# Patient Record
Sex: MALE
[De-identification: names, ages, dates, MRNs, and addresses within clinical notes are randomized per-mention and may not be internally consistent; named-entity substitution may affect disease eponyms.]

---

## 2018-01-31 ENCOUNTER — CHARTING TRANS (OUTPATIENT)
Dept: OTHER | Age: 43
End: 2018-01-31

## 2018-02-01 ENCOUNTER — CHARTING TRANS (OUTPATIENT)
Dept: OTHER | Age: 43
End: 2018-02-01

## 2024-07-17 ENCOUNTER — APPOINTMENT (OUTPATIENT)
Dept: ULTRASOUND IMAGING | Age: 49
End: 2024-07-17
Attending: EMERGENCY MEDICINE
Payer: COMMERCIAL

## 2024-07-17 ENCOUNTER — APPOINTMENT (OUTPATIENT)
Dept: GENERAL RADIOLOGY | Age: 49
End: 2024-07-17
Attending: EMERGENCY MEDICINE
Payer: COMMERCIAL

## 2024-07-17 ENCOUNTER — HOSPITAL ENCOUNTER (EMERGENCY)
Age: 49
Discharge: HOME | End: 2024-07-17
Attending: EMERGENCY MEDICINE
Payer: COMMERCIAL

## 2024-07-17 VITALS
DIASTOLIC BLOOD PRESSURE: 103 MMHG | RESPIRATION RATE: 16 BRPM | SYSTOLIC BLOOD PRESSURE: 134 MMHG | TEMPERATURE: 98 F | HEART RATE: 68 BPM | HEIGHT: 71 IN | BODY MASS INDEX: 30.1 KG/M2 | OXYGEN SATURATION: 100 % | WEIGHT: 215 LBS

## 2024-07-17 DIAGNOSIS — M25.562 ACUTE PAIN OF LEFT KNEE: Primary | ICD-10-CM

## 2024-07-17 DIAGNOSIS — R03.0 ELEVATED BLOOD PRESSURE READING: ICD-10-CM

## 2024-07-17 PROCEDURE — 99284 EMERGENCY DEPT VISIT MOD MDM: CPT

## 2024-07-17 PROCEDURE — 99285 EMERGENCY DEPT VISIT HI MDM: CPT

## 2024-07-17 PROCEDURE — 93971 EXTREMITY STUDY: CPT | Performed by: EMERGENCY MEDICINE

## 2024-07-17 PROCEDURE — 96372 THER/PROPH/DIAG INJ SC/IM: CPT

## 2024-07-17 PROCEDURE — 73562 X-RAY EXAM OF KNEE 3: CPT | Performed by: EMERGENCY MEDICINE

## 2024-07-17 RX ORDER — NAPROXEN SODIUM 220 MG
1-2 TABLET ORAL
COMMUNITY

## 2024-07-17 RX ORDER — KETOROLAC TROMETHAMINE 10 MG/1
10 TABLET, FILM COATED ORAL EVERY 6 HOURS PRN
Qty: 10 TABLET | Refills: 0 | Status: SHIPPED | OUTPATIENT
Start: 2024-07-17 | End: 2024-07-24

## 2024-07-17 RX ORDER — KETOROLAC TROMETHAMINE 30 MG/ML
60 INJECTION, SOLUTION INTRAMUSCULAR; INTRAVENOUS ONCE
Status: COMPLETED | OUTPATIENT
Start: 2024-07-17 | End: 2024-07-17

## 2024-07-17 NOTE — ED PROVIDER NOTES
Patient Seen in: [facility] In [place]      History     Chief Complaint   Patient presents with    Leg or Foot Injury     Stated Complaint: Left knee pain - denie injury    Subjective:   HPI    Patient is a 48-year-old male presenting to the ED with left knee pain for the last 4 to 5 days.  The patient denies any associated injury or trauma.  He works for the [employer].  He states despite his knee pain, he has not stopped working around his house as well as at work.  No known history of gout.  He does have some associated swelling.  No overlying rash or redness.  No fevers or chills.  No previous left knee surgery.  No fever or chills.  Pain is constant, worse with attempted movement and weightbearing, feels like his patella is moving, only alleviated with rest.  Weakness or loss of sensation.  No recent prolonged period of immobilization.  No recent surgery.  Patient has not been seen by orthopedics but has had similar episodes in the past.  Has been taking naproxen at home.  States he bought it at [employer].  Pain is rated as 6 out of 10    Objective:   Past Medical History:    Pancreatitis (HCC)              History reviewed. No pertinent surgical history.             No pertinent social history.            Review of Systems    Positive for stated Chief Complaint: Leg or Foot Injury    Other systems are as noted in HPI.  Constitutional and vital signs reviewed.      All other systems reviewed and negative except as noted above.    Physical Exam     ED Triage Vitals [07/17/24 0442]   BP (!) 158/99   Pulse 88   Resp 16   Temp 98.4 °F (36.9 °C)   Temp src Temporal   SpO2 100 %   O2 Device None (Room air)       Current Vitals:   Vital Signs  BP: (!) 158/99  Pulse: 88  Resp: 16  Temp: 98.4 °F (36.9 °C)  Temp src: Temporal    Oxygen Therapy  SpO2: 100 %  O2 Device: None (Room air)            Physical Exam  Vitals and nursing note reviewed.   Constitutional:       General: He is not in acute  distress.     Appearance: Normal appearance. He is not ill-appearing.      Comments: Patient arrived with crutches   HENT:      Head: Normocephalic and atraumatic.      Right Ear: External ear normal.      Left Ear: External ear normal.      Nose: Nose normal.      Mouth/Throat:      Mouth: Mucous membranes are moist.      Pharynx: Oropharynx is clear. No posterior oropharyngeal erythema.   Cardiovascular:      Rate and Rhythm: Normal rate and regular rhythm.      Pulses: Normal pulses.   Pulmonary:      Effort: Pulmonary effort is normal. No respiratory distress.      Breath sounds: Normal breath sounds.   Musculoskeletal:         General: Swelling and tenderness present. No deformity or signs of injury.      Left knee: Swelling present. No deformity, erythema, ecchymosis or bony tenderness. Decreased range of motion (flexion). Tenderness present over the patellar tendon. No ACL laxity or PCL laxity.Normal alignment and normal patellar mobility.      Instability Tests: Anterior drawer test negative. Posterior drawer test negative.      Right lower leg: No edema.      Left lower leg: Edema (1/4 pitting edema) present.   Skin:     General: Skin is warm.      Capillary Refill: Capillary refill takes less than 2 seconds.      Findings: No rash.   Neurological:      Mental Status: He is alert and oriented to person, place, and time.   Psychiatric:         Mood and Affect: Mood normal.         Behavior: Behavior normal.               ED Course   Labs Reviewed - No data to display                   MDM        History is obtained from patient who is a good historian.  Differential diagnosis include DVT, tendinitis, bursitis, arthritis, gout, knee strain.  Records were reviewed.  Patient was last seen in the ED September 2021 for COVID.  He does not have any office visits since then.  Patient states he has no primary care provider.  He is working on trying to establish one.  Patient has also been seen for shoulder pain in  the past.  Testing considered and ordered includes left lower extremity ultrasound as well as x-ray was obtained to further assess patient's symptoms.  These results were independently reviewed with x-ray results swelling noted, possible effusion.  No fracture.      Radiology reports were also reviewed as noted below.  XR KNEE (3 VIEWS), LEFT (CPT=73562)    Result Date: 7/17/2024  PROCEDURE:  XR KNEE ROUTINE (3 VIEWS), LEFT (CPT=73562)  TECHNIQUE:  Three views were obtained including patellar view.  COMPARISON:  [facility], US, US VENOUS DOPPLER LEG LEFT - DIAG IMG (CPT=93971), 7/17/2024, 4:49 AM.  INDICATIONS:  PATIENT STATED HISTORY: (As transcribed by Technologist)  Left knee pain - denie injury.     FINDINGS:  BONES:  Osseous structures are intact.  Joint spaces are preserved.  No significant arthropathy.  Incidental note of lateral patellar tilt. SOFT TISSUES:  Soft tissue swelling. EFFUSION:  Trace suprapatellar joint fluid suspected.            CONCLUSION:  1. Prepatellar soft tissue swelling, correlate clinically.   LOCATION:  [facility]   Dictated by (CST): Selin Martin MD on 7/17/2024 at 6:33 AM     Finalized by (CST): Selin Martin MD on 7/17/2024 at 6:35 AM         LEFT LOWER EXTREMITY DUPLEX ULTRASOUND:  IMPRESSION    No evidence of deep venous thrombosis.  Normal compression, augmentation, and blood flow.    Discussed x-ray results with patient.  There is no large effusion for arthrocentesis or testing.  The patient does have some overlying swelling over the knee to suggest cellulitis.  Swelling and pain may still be secondary to tendinitis or bursitis.    Discussed use of NSAIDs, rest, ice, elevate, nonweightbearing if painful with outpatient follow-up with orthopedics.  Immobilizer was provided to patient.  Return to ED if any symptoms worsen, persist, or new symptoms develop.  Otherwise close outpatient follow-up as discussed.                         Medical Decision Making      Disposition and Plan      Clinical Impression:  No diagnosis found.     Disposition:  There is no disposition on file for this visit.  There is no disposition time on file for this visit.    Follow-up:  No follow-up provider specified.        Medications Prescribed:  Current Discharge Medication List

## 2024-07-17 NOTE — DISCHARGE INSTRUCTIONS
Nonweightbearing to the left lower extremity until reevaluation of all symptoms.  Continue naproxen and start Toradol as prescribed.  Monitor your blood pressure, outpatient follow-up with the primary care provider for reevaluation of BP.  You had an elevated blood pressure reading today in the ED